# Patient Record
Sex: MALE | Race: ASIAN | Employment: UNEMPLOYED | URBAN - METROPOLITAN AREA
[De-identification: names, ages, dates, MRNs, and addresses within clinical notes are randomized per-mention and may not be internally consistent; named-entity substitution may affect disease eponyms.]

---

## 2024-04-29 ENCOUNTER — HOSPITAL ENCOUNTER (OUTPATIENT)
Facility: HOSPITAL | Age: 74
Setting detail: OBSERVATION
Discharge: HOME | End: 2024-05-02
Attending: EMERGENCY MEDICINE | Admitting: HOSPITALIST
Payer: COMMERCIAL

## 2024-04-29 ENCOUNTER — APPOINTMENT (OUTPATIENT)
Dept: CT IMAGING | Facility: HOSPITAL | Age: 74
End: 2024-04-29
Attending: EMERGENCY MEDICINE
Payer: COMMERCIAL

## 2024-04-29 ENCOUNTER — APPOINTMENT (OUTPATIENT)
Dept: GENERAL RADIOLOGY | Facility: HOSPITAL | Age: 74
End: 2024-04-29
Attending: EMERGENCY MEDICINE
Payer: COMMERCIAL

## 2024-04-29 DIAGNOSIS — S00.03XA CONTUSION OF SCALP, INITIAL ENCOUNTER: ICD-10-CM

## 2024-04-29 DIAGNOSIS — R07.9 CHEST PAIN OF UNCERTAIN ETIOLOGY: ICD-10-CM

## 2024-04-29 DIAGNOSIS — R55 SYNCOPE AND COLLAPSE: Primary | ICD-10-CM

## 2024-04-29 PROBLEM — E87.20 METABOLIC ACIDOSIS: Status: ACTIVE | Noted: 2024-04-29

## 2024-04-29 PROBLEM — R73.9 HYPERGLYCEMIA: Status: ACTIVE | Noted: 2024-04-29

## 2024-04-29 PROBLEM — E87.1 HYPONATREMIA: Status: ACTIVE | Noted: 2024-04-29

## 2024-04-29 LAB
ALBUMIN SERPL-MCNC: 3.8 G/DL (ref 3.4–5)
ALBUMIN/GLOB SERPL: 1 {RATIO} (ref 1–2)
ALP LIVER SERPL-CCNC: 56 U/L
ALT SERPL-CCNC: 22 U/L
ANION GAP SERPL CALC-SCNC: 12 MMOL/L (ref 0–18)
APTT PPP: 32.5 SECONDS (ref 23.3–35.6)
AST SERPL-CCNC: 15 U/L (ref 15–37)
BASOPHILS # BLD AUTO: 0.06 X10(3) UL (ref 0–0.2)
BASOPHILS NFR BLD AUTO: 0.6 %
BILIRUB SERPL-MCNC: 0.5 MG/DL (ref 0.1–2)
BUN BLD-MCNC: 22 MG/DL (ref 9–23)
CALCIUM BLD-MCNC: 9 MG/DL (ref 8.5–10.1)
CHLORIDE SERPL-SCNC: 103 MMOL/L (ref 98–112)
CO2 SERPL-SCNC: 19 MMOL/L (ref 21–32)
CREAT BLD-MCNC: 1.34 MG/DL
D DIMER PPP FEU-MCNC: 0.3 UG/ML FEU (ref ?–0.72)
EGFRCR SERPLBLD CKD-EPI 2021: 56 ML/MIN/1.73M2 (ref 60–?)
EOSINOPHIL # BLD AUTO: 0.29 X10(3) UL (ref 0–0.7)
EOSINOPHIL NFR BLD AUTO: 3 %
ERYTHROCYTE [DISTWIDTH] IN BLOOD BY AUTOMATED COUNT: 13.5 %
GLOBULIN PLAS-MCNC: 3.8 G/DL (ref 2.8–4.4)
GLUCOSE BLD-MCNC: 177 MG/DL (ref 70–99)
HCT VFR BLD AUTO: 39.7 %
HGB BLD-MCNC: 12.7 G/DL
IMM GRANULOCYTES # BLD AUTO: 0.04 X10(3) UL (ref 0–1)
IMM GRANULOCYTES NFR BLD: 0.4 %
INR BLD: 1.36 (ref 0.8–1.2)
LACTATE SERPL-SCNC: 1.4 MMOL/L (ref 0.4–2)
LYMPHOCYTES # BLD AUTO: 3.6 X10(3) UL (ref 1–4)
LYMPHOCYTES NFR BLD AUTO: 37.3 %
MCH RBC QN AUTO: 27.1 PG (ref 26–34)
MCHC RBC AUTO-ENTMCNC: 32 G/DL (ref 31–37)
MCV RBC AUTO: 84.6 FL
MONOCYTES # BLD AUTO: 1.19 X10(3) UL (ref 0.1–1)
MONOCYTES NFR BLD AUTO: 12.3 %
NEUTROPHILS # BLD AUTO: 4.46 X10 (3) UL (ref 1.5–7.7)
NEUTROPHILS # BLD AUTO: 4.46 X10(3) UL (ref 1.5–7.7)
NEUTROPHILS NFR BLD AUTO: 46.4 %
NT-PROBNP SERPL-MCNC: 878 PG/ML (ref ?–125)
OSMOLALITY SERPL CALC.SUM OF ELEC: 286 MOSM/KG (ref 275–295)
PLATELET # BLD AUTO: 164 10(3)UL (ref 150–450)
POTASSIUM SERPL-SCNC: 4 MMOL/L (ref 3.5–5.1)
PROT SERPL-MCNC: 7.6 G/DL (ref 6.4–8.2)
PROTHROMBIN TIME: 16.8 SECONDS (ref 11.6–14.8)
RBC # BLD AUTO: 4.69 X10(6)UL
SODIUM SERPL-SCNC: 134 MMOL/L (ref 136–145)
TROPONIN I SERPL HS-MCNC: 22 NG/L
WBC # BLD AUTO: 9.6 X10(3) UL (ref 4–11)

## 2024-04-29 PROCEDURE — 99223 1ST HOSP IP/OBS HIGH 75: CPT | Performed by: HOSPITALIST

## 2024-04-29 PROCEDURE — 70450 CT HEAD/BRAIN W/O DYE: CPT | Performed by: EMERGENCY MEDICINE

## 2024-04-29 PROCEDURE — 71045 X-RAY EXAM CHEST 1 VIEW: CPT | Performed by: EMERGENCY MEDICINE

## 2024-04-29 PROCEDURE — 72125 CT NECK SPINE W/O DYE: CPT | Performed by: EMERGENCY MEDICINE

## 2024-04-29 RX ORDER — FUROSEMIDE 20 MG/1
10 TABLET ORAL DAILY
COMMUNITY

## 2024-04-29 RX ORDER — PANTOPRAZOLE SODIUM 40 MG/1
40 TABLET, DELAYED RELEASE ORAL
COMMUNITY

## 2024-04-29 RX ORDER — SPIRONOLACTONE 25 MG/1
25 TABLET ORAL DAILY
COMMUNITY

## 2024-04-29 RX ORDER — RANOLAZINE 500 MG/1
500 TABLET, EXTENDED RELEASE ORAL DAILY
COMMUNITY

## 2024-04-29 RX ORDER — ROSUVASTATIN CALCIUM 20 MG/1
20 TABLET, COATED ORAL NIGHTLY
COMMUNITY

## 2024-04-29 RX ORDER — NITROGLYCERIN 6.5 MG/1
6.5 CAPSULE ORAL 2 TIMES DAILY
COMMUNITY

## 2024-04-29 RX ORDER — CLOPIDOGREL BISULFATE 75 MG/1
75 TABLET ORAL DAILY
COMMUNITY

## 2024-04-29 RX ORDER — ASPIRIN 81 MG/1
75 TABLET ORAL DAILY
Status: ON HOLD | COMMUNITY
End: 2024-04-30

## 2024-04-29 RX ORDER — CARVEDILOL 3.12 MG/1
3.12 TABLET ORAL DAILY
COMMUNITY
End: 2024-05-02

## 2024-04-29 RX ORDER — BUDESONIDE AND FORMOTEROL FUMARATE DIHYDRATE 160; 4.5 UG/1; UG/1
AEROSOL RESPIRATORY (INHALATION) 2 TIMES DAILY
COMMUNITY

## 2024-04-29 RX ORDER — ASPIRIN 81 MG/1
324 TABLET, CHEWABLE ORAL ONCE
Status: COMPLETED | OUTPATIENT
Start: 2024-04-29 | End: 2024-04-29

## 2024-04-30 ENCOUNTER — APPOINTMENT (OUTPATIENT)
Dept: CV DIAGNOSTICS | Facility: HOSPITAL | Age: 74
End: 2024-04-30
Attending: HOSPITALIST
Payer: COMMERCIAL

## 2024-04-30 PROBLEM — R07.9 CHEST PAIN OF UNCERTAIN ETIOLOGY: Status: ACTIVE | Noted: 2024-04-30

## 2024-04-30 PROBLEM — I48.0 PAROXYSMAL ATRIAL FIBRILLATION (HCC): Chronic | Status: ACTIVE | Noted: 2024-04-30

## 2024-04-30 PROBLEM — S00.03XA CONTUSION OF SCALP, INITIAL ENCOUNTER: Status: ACTIVE | Noted: 2024-04-30

## 2024-04-30 PROBLEM — E78.00 PURE HYPERCHOLESTEROLEMIA: Chronic | Status: ACTIVE | Noted: 2024-04-30

## 2024-04-30 LAB
ATRIAL RATE: 89 BPM
BILIRUB UR QL STRIP.AUTO: NEGATIVE
C DIFF TOX B STL QL: NEGATIVE
CLARITY UR REFRACT.AUTO: CLEAR
COLOR UR AUTO: YELLOW
EST. AVERAGE GLUCOSE BLD GHB EST-MCNC: 157 MG/DL (ref 68–126)
GLUCOSE UR STRIP.AUTO-MCNC: NORMAL MG/DL
HBA1C MFR BLD: 7.1 % (ref ?–5.7)
HYALINE CASTS #/AREA URNS AUTO: PRESENT /LPF
KETONES UR STRIP.AUTO-MCNC: NEGATIVE MG/DL
LEUKOCYTE ESTERASE UR QL STRIP.AUTO: NEGATIVE
NITRITE UR QL STRIP.AUTO: NEGATIVE
P AXIS: 68 DEGREES
P-R INTERVAL: 200 MS
PH UR STRIP.AUTO: 6.5 [PH] (ref 5–8)
PROT UR STRIP.AUTO-MCNC: 30 MG/DL
Q-T INTERVAL: 438 MS
QRS DURATION: 144 MS
QTC CALCULATION (BEZET): 532 MS
R AXIS: -24 DEGREES
RBC UR QL AUTO: NEGATIVE
SP GR UR STRIP.AUTO: 1.02 (ref 1–1.03)
T AXIS: 90 DEGREES
TROPONIN I SERPL HS-MCNC: 41 NG/L
TROPONIN I SERPL HS-MCNC: 44 NG/L
UROBILINOGEN UR STRIP.AUTO-MCNC: NORMAL MG/DL
VENTRICULAR RATE: 89 BPM

## 2024-04-30 PROCEDURE — 93306 TTE W/DOPPLER COMPLETE: CPT | Performed by: HOSPITALIST

## 2024-04-30 PROCEDURE — 99232 SBSQ HOSP IP/OBS MODERATE 35: CPT | Performed by: INTERNAL MEDICINE

## 2024-04-30 RX ORDER — MELATONIN
3 NIGHTLY PRN
Status: DISCONTINUED | OUTPATIENT
Start: 2024-04-30 | End: 2024-05-02

## 2024-04-30 RX ORDER — ACETAMINOPHEN 500 MG
500 TABLET ORAL EVERY 4 HOURS PRN
Status: DISCONTINUED | OUTPATIENT
Start: 2024-04-30 | End: 2024-05-02

## 2024-04-30 RX ORDER — METOCLOPRAMIDE HYDROCHLORIDE 5 MG/ML
10 INJECTION INTRAMUSCULAR; INTRAVENOUS EVERY 8 HOURS PRN
Status: DISCONTINUED | OUTPATIENT
Start: 2024-04-30 | End: 2024-05-02

## 2024-04-30 RX ORDER — FLUTICASONE FUROATE AND VILANTEROL 200; 25 UG/1; UG/1
1 POWDER RESPIRATORY (INHALATION) DAILY
Status: DISCONTINUED | OUTPATIENT
Start: 2024-04-30 | End: 2024-05-02

## 2024-04-30 RX ORDER — ASPIRIN 81 MG/1
75 TABLET, CHEWABLE ORAL DAILY
Status: DISCONTINUED | OUTPATIENT
Start: 2024-04-30 | End: 2024-04-30

## 2024-04-30 RX ORDER — ONDANSETRON 2 MG/ML
4 INJECTION INTRAMUSCULAR; INTRAVENOUS EVERY 6 HOURS PRN
Status: DISCONTINUED | OUTPATIENT
Start: 2024-04-30 | End: 2024-05-02

## 2024-04-30 RX ORDER — ROSUVASTATIN CALCIUM 20 MG/1
20 TABLET, COATED ORAL NIGHTLY
Status: DISCONTINUED | OUTPATIENT
Start: 2024-04-30 | End: 2024-05-02

## 2024-04-30 RX ORDER — FUROSEMIDE 20 MG/1
10 TABLET ORAL DAILY
Status: DISCONTINUED | OUTPATIENT
Start: 2024-04-30 | End: 2024-05-02

## 2024-04-30 RX ORDER — ASPIRIN 81 MG/1
75 TABLET, CHEWABLE ORAL DAILY
COMMUNITY
End: 2024-05-02

## 2024-04-30 RX ORDER — SPIRONOLACTONE 25 MG/1
25 TABLET ORAL DAILY
Status: DISCONTINUED | OUTPATIENT
Start: 2024-04-30 | End: 2024-05-02

## 2024-04-30 RX ORDER — RANOLAZINE 500 MG/1
500 TABLET, EXTENDED RELEASE ORAL DAILY
Status: DISCONTINUED | OUTPATIENT
Start: 2024-04-30 | End: 2024-05-02

## 2024-04-30 RX ORDER — PANTOPRAZOLE SODIUM 40 MG/1
40 TABLET, DELAYED RELEASE ORAL
Status: DISCONTINUED | OUTPATIENT
Start: 2024-04-30 | End: 2024-05-02

## 2024-04-30 RX ORDER — CARVEDILOL 3.12 MG/1
3.12 TABLET ORAL 2 TIMES DAILY WITH MEALS
Status: DISCONTINUED | OUTPATIENT
Start: 2024-04-30 | End: 2024-05-02

## 2024-04-30 RX ORDER — CLOPIDOGREL BISULFATE 75 MG/1
75 TABLET ORAL DAILY
Status: DISCONTINUED | OUTPATIENT
Start: 2024-04-30 | End: 2024-05-02

## 2024-04-30 RX ORDER — CARVEDILOL 3.12 MG/1
3.12 TABLET ORAL DAILY
Status: DISCONTINUED | OUTPATIENT
Start: 2024-04-30 | End: 2024-04-30

## 2024-04-30 NOTE — H&P
[facility]     Yazan De León Patient Status:  Observation    1950 MRN [id number]   Location [facility] 2NE-A Attending Jeff Padilla DO   Hosp Day # 0 PCP No primary care provider on file.     Chief Complaint: syncope    Subjective:    History of Present Illness:     Yazan De León is a 73 year old male with history of coronary disease, congestive heart failure, COPD, hyper lipidemia [place] after being in [place] was walking cough across the room and passed out it occurred shortly after he stood up.  Son says he was out for about 5 minutes and was complaining of chest pain and some shortness of breath.  No fevers, chills, nausea, vomiting, diarrhea or constipation.  No palpitations.    History/Other:    Past Medical History:  Past Medical History:    Atherosclerosis of coronary artery    Congestive heart disease (HCC)    COPD (chronic obstructive pulmonary disease) (HCC)    Heart attack (HCC)    High cholesterol    History of angioplasty     Past Surgical History:   History reviewed. No pertinent surgical history.   Family History:   No family history on file.  Social History:    reports that he has quit smoking. His smoking use included cigarettes. He has never used smokeless tobacco. He reports current alcohol use of about 1.0 standard drink of alcohol per week. He reports that he does not use drugs.     Allergies: No Known Allergies    Medications:    No current facility-administered medications on file prior to encounter.     Current Outpatient Medications on File Prior to Encounter   Medication Sig Dispense Refill    aspirin 81 MG Oral Chew Tab Chew 75 mg by mouth daily.      NON FORMULARY Take 30 mg by mouth daily. Domperidone 30 mg daily      NON FORMULARY Place 5 mg under the tongue daily as needed (chest pain).      nitroglycerin ER 6.5 MG Oral Cap CR Take 1 capsule (6.5 mg total) by mouth 2 (two) times daily.       clopidogrel 75 MG Oral Tab Take 1 tablet (75 mg total) by mouth daily.      rosuvastatin 20 MG Oral Tab Take 1 tablet (20 mg total) by mouth nightly.      carvedilol 3.125 MG Oral Tab Take 1 tablet (3.125 mg total) by mouth daily.      apixaban 5 MG Oral Tab Take 1 tablet (5 mg total) by mouth daily.      furosemide 20 MG Oral Tab Take 0.5 tablets (10 mg total) by mouth daily.      spironolactone 25 MG Oral Tab Take 1 tablet (25 mg total) by mouth daily.      pantoprazole 40 MG Oral Tab EC Take 1 tablet (40 mg total) by mouth every morning before breakfast.      ranolazine  MG Oral Tablet 12 Hr Take 1 tablet (500 mg total) by mouth daily.      Budesonide-Formoterol Fumarate 160-4.5 MCG/ACT Inhalation Aerosol Inhale into the lungs 2 (two) times daily.         Review of Systems:   A comprehensive review of systems was completed.    Pertinent positives and negatives noted in the HPI.    Objective:   Physical Exam:    /71 (BP Location: Left arm)   Pulse 70   Temp 97.4 °F (36.3 °C) (Oral)   Resp 16   Wt 139 lb 8.8 oz (63.3 kg)   SpO2 98%   General: No acute distress, Alert  Respiratory: No rhonchi, no wheezes  Cardiovascular: S1, S2. Regular rate and rhythm  Abdomen: Soft, Non-tender, non-distended, positive bowel sounds  Neuro: No new focal deficits  Extremities: No edema      Results:    Labs:      Labs Last 24 Hours:    Recent Labs   Lab 04/29/24 2110   RBC 4.69   HGB 12.7*   HCT 39.7   MCV 84.6   MCH 27.1   MCHC 32.0   RDW 13.5   NEPRELIM 4.46   WBC 9.6   .0       Recent Labs   Lab 04/29/24 2110   *   BUN 22   CREATSERUM 1.34*   EGFRCR 56*   CA 9.0   ALB 3.8   *   K 4.0      CO2 19.0*   ALKPHO 56   AST 15   ALT 22   BILT 0.5   TP 7.6       Lab Results   Component Value Date    INR 1.36 (H) 04/29/2024       Recent Labs   Lab 04/29/24 2110   TROPHS 22       Recent Labs   Lab 04/29/24 2110   PBNP 878*       No results for input(s): \"PCT\" in the last 168  hours.    Imaging: Imaging data reviewed in Epic.    Assessment & Plan:      # Syncope  - Will check orthostatic blood pressures  - Echo ordered.  - Admit to CTU for cardiac monitoring.    # CAD  - S/P stents  - Will continue on plavix, b-blocker, statin, ASA  - serial troponins  - cardiology on consult.    # CHF  - Echo  - lasix, spironolactone, b-blocker,     # Atrial fibrillation  - Will continue on b-blocker and eliquis.    # Scalp contusion  -Treated in the ER.        Plan of care discussed with patient at bedside.    Yoav Melendez, DO    Supplementary Documentation:     The 21st Century Cures Act makes medical notes like these available to patients in the interest of transparency. Please be advised this is a medical document. Medical documents are intended to carry relevant information, facts as evident, and the clinical opinion of the practitioner. The medical note is intended as peer to peer communication and may appear blunt or direct. It is written in medical language and may contain abbreviations or verbiage that are unfamiliar.

## 2024-04-30 NOTE — PLAN OF CARE
Patient is aox3 ,vss,ra,lungs clear, diminished at the bases. NSR. Positive bowel sound, bm this morning. No edema to legs. Denies any chest pain. Trop neg. Echo today. Patient came from [place] 1 month ago and has been coughing and lost weight in the past. Son stated TB test in dec was negative and last echo in [facility] was EF 30%.  Echo done this am.. Dr Dailey saw patient and is adjusting cardiac meds.   Quantiferon ordered.   Up ad flora.   Problem: Patient/Family Goals  Goal: Patient/Family Long Term Goal  Description: Patient's Long Term Goal: Stay out of the hospital    Interventions:  - medications  -follow up  - See additional Care Plan goals for specific interventions  4/30/2024 0933 by Ijeoma Ding, RN  Outcome: Progressing  4/30/2024 0932 by Ijeoma Ding, RN  Outcome: Progressing  Goal: Patient/Family Short Term Goal  Description: Patient's Short Term Goal: manage chest pain    Interventions:   - ASA  -cardiology to see   - See additional Care Plan goals for specific interventions  4/30/2024 0933 by Ijeoma Ding, RN  Outcome: Progressing  4/30/2024 0932 by Ijeoma Ding, RN  Outcome: Progressing

## 2024-04-30 NOTE — ED PROVIDER NOTES
Patient Seen in: [facility] Emergency Department      History     Chief Complaint   Patient presents with    Difficulty Breathing    Fall     Stated Complaint: Pt to ED for difficulty breathing and possible chest pain - EMS says previous MI    Subjective:   HPI    Patient is a 73-year-old male who came a month ago from [place].  Patient has a history of 2 prior MIs and stents according to the son.  Patient today stood up and was walking across the room and passed out.  Patient was unresponsive for about 5 minutes according to the son.  Patient reportedly complained of chest pain with some shortness of breath.   EMS was called and patient was brought in for evaluation.  Patient had chest pain earlier but no chest pain currently, no shortness of breath.  Patient reportedly had 25 to 30% ejection fraction according to the son.  Patient denies previous episodes of syncope.  No abdominal pain, remainder review of systems negative.    Objective:   Past Medical History:    Atherosclerosis of coronary artery    Congestive heart disease (HCC)    COPD (chronic obstructive pulmonary disease) (HCC)    Heart attack (HCC)    History of angioplasty              History reviewed. No pertinent surgical history.             Social History     Socioeconomic History    Marital status: Unknown   Tobacco Use    Smoking status: Former     Types: Cigarettes    Smokeless tobacco: Never   Vaping Use    Vaping status: Never Used   Substance and Sexual Activity    Alcohol use: Yes     Alcohol/week: 1.0 standard drink of alcohol     Types: 1 Standard drinks or equivalent per week    Drug use: Never              Review of Systems    Positive for stated complaint: Pt to ED for difficulty breathing and possible chest pain - EMS says previous MI  Other systems are as noted in HPI.  Constitutional and vital signs reviewed.      All other systems reviewed and negative except as noted above.    Physical Exam     ED Triage Vitals [04/29/24 2107]    /70   Pulse 89   Resp 18   Temp    Temp src    SpO2 95 %   O2 Device None (Room air)       Current:/74   Pulse 71   Resp 19   Wt 70.3 kg   SpO2 99%         Physical Exam  GENERAL: Patient resting comfortably on the cart in no acute distress.  HEENT: Extraocular muscles intact, pupils equal round reactive to light and accommodation.  Mouth normal, neck supple, no meningismus.  Mild tenderness mid cervical region, abrasion to the top of his head.  LUNGS: Lungs clear to auscultation bilaterally.  CARDIOVASCULAR: + S1-S2, regular rate and rhythm, no murmurs.  BACK: No CVA tenderness, no midline bony tenderness.  ABDOMEN: + Bowel sounds, soft, nontender, nondistended.  No rebound, no guarding, no hepatosplenomegaly.  EXTREMITIES: Full range of motion, no tenderness, good capillary refill.  No calf tenderness or edema  SKIN: No rash, good turgor.  NEURO: Patient answers questions appropriately.  No focal deficits appreciated.  Conversant with son.  Moves all extremities.  5 out of 5 strength upper lower extremities.  Sensation tact light touch upper lower extremities.           ED Course     Labs Reviewed   COMP METABOLIC PANEL (14) - Abnormal; Notable for the following components:       Result Value    Glucose 177 (*)     Sodium 134 (*)     CO2 19.0 (*)     Creatinine 1.34 (*)     eGFR-Cr 56 (*)     All other components within normal limits   PRO BETA NATRIURETIC PEPTIDE - Abnormal; Notable for the following components:    Pro-Beta Natriuretic Peptide 878 (*)     All other components within normal limits   PROTHROMBIN TIME (PT) - Abnormal; Notable for the following components:    PT 16.8 (*)     INR 1.36 (*)     All other components within normal limits   CBC W/ DIFFERENTIAL - Abnormal; Notable for the following components:    HGB 12.7 (*)     Monocyte Absolute 1.19 (*)     All other components within normal limits   LACTIC ACID, PLASMA - Normal   TROPONIN I HIGH SENSITIVITY - Normal   D-DIMER - Normal    PTT, ACTIVATED - Normal   CBC WITH DIFFERENTIAL WITH PLATELET    Narrative:     The following orders were created for panel order CBC With Differential With Platelet.  Procedure                               Abnormality         Status                     ---------                               -----------         ------                     CBC W/ DIFFERENTIAL[[id number]]          Abnormal            Final result                 Please view results for these tests on the individual orders.   URINALYSIS, ROUTINE   RAINBOW DRAW LAVENDER   RAINBOW DRAW LIGHT GREEN   RAINBOW DRAW GOLD   RAINBOW DRAW BLUE     EKG    Rate, intervals and axes as noted on EKG Report.  Rate: 89  Rhythm: Sinus Rhythm  Reading: Left bundle branch block, nonspecific ST changes                 CT brain Negative exam.         CT C-spine1. No acute injuries to the cervical spine.  No soft tissue swelling.   2. Multilevel degenerative disc changes, most pronounced at C5-6 where there is suggestion of mild central canal stenosis and mild bilateral neural foraminal stenosis.   3. Relative thyroid enlargement with multilobular goiter.          Chest x-ray1. No acute process.   2. Mild cardiomegaly.   Independent reviewed by myself, no pneumothorax         MDM    Patient was stable throughout his stay in the emergency department.  Patient had no further chest pain.  Patient did have negative troponin.  Patient had negative D-dimer.  Patient is on blood thinners did have CT head and neck negative for fracture or intracranial hemorrhage.  Patient with low ejection fraction and syncope will be admitted for further evaluation.  I did consider arrhythmia, syncope, subdural, PE, ACS.  [facility]    Admission disposition: 4/29/2024 11:44 PM                                        Medical Decision Making      Disposition and Plan     Clinical Impression:  1. Syncope and collapse    2. Contusion of scalp, initial encounter    3.  Chest pain of uncertain etiology         Disposition:  Admit  4/29/2024 11:44 pm    Follow-up:  No follow-up provider specified.        Medications Prescribed:  Current Discharge Medication List                            Hospital Problems       Present on Admission             ICD-10-CM Noted POA    * (Principal) Syncope and collapse R55 4/29/2024 Unknown    Hyperglycemia R73.9 4/29/2024 Yes    Hyponatremia E87.1 4/29/2024 Yes    Metabolic acidosis E87.20 4/29/2024 Yes

## 2024-04-30 NOTE — PROGRESS NOTES
[facility]   part of [facility]     Hospitalist Progress Note     Yazan De León Patient Status:  Observation    1950 MRN [id number]   Location [facility] 2NE-A Attending Milagro Perry,    Hosp Day # 0 PCP No primary care provider on file.     Chief Complaint: Syncope    Subjective:     Patient ambulated without any dizziness or lightheadedness. Denies any chest pain or SOB.    Objective:    Review of Systems:   A comprehensive review of systems was completed; pertinent positive and negatives stated in subjective.    Vital signs:  Temp:  [97.7 °F (36.5 °C)-98.1 °F (36.7 °C)] 98.1 °F (36.7 °C)  Pulse:  [74-90] 74  Resp:  [16-18] 18  BP: ()/(52-83) 106/67  SpO2:  [95 %-100 %] 98 %    Physical Exam:    General: No acute distress, awake and alert  Respiratory: No wheezes, no rhonchi  Cardiovascular: S1, S2, regular rate and rhythm  Abdomen: Soft, Non-tender, non-distended, positive bowel sounds  Neuro: RUSS x 4  Extremities: No edema    Diagnostic Data:    Labs:  Recent Labs   Lab 24  0656   WBC 9.6 5.1   HGB 12.7* 12.8*   MCV 84.6 86.7   .0 145.0*   INR 1.36*  --      Recent Labs   Lab 24  0656   * 120*   BUN 22 16   CREATSERUM 1.34* 1.08   CA 9.0 9.5   ALB 3.8  --    * 136   K 4.0 4.3    105   CO2 19.0* 28.0   ALKPHO 56  --    AST 15  --    ALT 22  --    BILT 0.5  --    TP 7.6  --      CrCl cannot be calculated (Unknown ideal weight.).    Recent Labs   Lab 24  1158 24  1850   TROPHS 22 44 41     Recent Labs   Lab 24   PTP 16.8*   INR 1.36*      Microbiology  No results found for this visit on 24.    Imaging: Reviewed in Epic.    Medications:    lisinopril  2.5 mg Oral Daily    fluticasone furoate-vilanterol  1 puff Inhalation Daily    clopidogrel  75 mg Oral Daily    furosemide  10 mg Oral Daily    pantoprazole  40 mg Oral QAM AC    ranolazine ER  500 mg Oral Daily     rosuvastatin  20 mg Oral Nightly    spironolactone  25 mg Oral Daily    apixaban  5 mg Oral BID    carvedilol  3.125 mg Oral BID with meals       Assessment & Plan:      #Syncope, unclear etiology. ?Cardiac  #Combined systolic/diastolic HF; unclear chronicity of current severity/dysfunction  -EKG with LBBB, no prior comparison available  -Serial troponin negative  -Orthostatics negative  -Echo with eF 10-15%, severe diffuse hypokinesis, G1DD  -Telemetry  -PTA spironolactone, carvedilol, lasix. Also started on low dose lisinopril. Plan for SGLT-2 inhibitor on discharge  -Cardiology consulted, patient refusing R/LHC. Also refusing wearable defibrillator. Plan for medical management     #CAD w/ hx of stents  #HLD  -Plavix, statin, carvedilol, Ranexa     #Atrial fibrillation  -Carvedilol, Eliquis    #DM type II, newly diagnosed with HgA1c 7.1%  -Start ISS, metformin on discharge    #Hx of smoking, possible underlying lung disease with chronic cough  -Resume home inhalers  -TB quantiferon ordered    #GERD  -PPI    Discussed with patient, family, RN, Dr. Corby Blanco.    Lane Blanco, DO    Supplementary Documentation:     Quality:  DVT Mechanical Prophylaxis:        DVT Pharmacologic Prophylaxis   Medication    apixaban (Eliquis) tab 5 mg      DVT Pharmacologic prophylaxis: Aspirin 162 mg     Code Status: Full  Ayoub: No urinary catheter in place  SUNNI: 5/1/2024    Discharge is dependent on: Clinical state, cardiology recs  At this point Mr. De León is expected to be discharge to: Home    The 21st Century Cures Act makes medical notes like these available to patients in the interest of transparency. Please be advised this is a medical document. Medical documents are intended to carry relevant information, facts as evident, and the clinical opinion of the practitioner. The medical note is intended as peer to peer communication and may appear blunt or direct. It is written in medical language and may contain abbreviations  or verbiage that are unfamiliar.

## 2024-04-30 NOTE — PLAN OF CARE
Received pt from ER. Aox4, romulo speaking, pt son present to help interpret. Pt denies any current chest pain. SPO2 is 96% RA, breath sounds clear b/l. NSR, orthostatic bp done, no c/o chest pain. Pt continent, last bm 4/29/24. Skin check was done with Oksana BERGERON RN, no wounds or incisions.     Bed is locked and in low position. Call light and personal items within reach.  Pt and family updated with plan of care           Problem: Patient/Family Goals  Goal: Patient/Family Long Term Goal  Description: Patient's Long Term Goal: Stay out of the hospital    Interventions:  - medications  -follow up  - See additional Care Plan goals for specific interventions  Outcome: Progressing  Goal: Patient/Family Short Term Goal  Description: Patient's Short Term Goal: manage chest pain    Interventions:   - ASA  -cardiology to see   - See additional Care Plan goals for specific interventions  Outcome: Progressing

## 2024-04-30 NOTE — CONSULTS
Cardiology Consultation      Yazan De León Patient Status:  Observation    1950 MRN [id number]   [facility] 2NE-A Attending Jeff Padilla DO   Hosp Day # 0 PCP No primary care provider on file.     Reason for Consultation:  Syncope     History of Present Illness:  Yazan De León is a(n) 73 year old male with chronic medical conditions including hx of MI with hx of PCI in [place] , , HFrEF, HTN, HLD, GERD, AFib who presents with episode of passing out yesterday evening. He lost consciousness and does not recall that event as noted by his daughter in law at bedside. Prior to episode, he noted feeling dizzy and acutely short of breath prompting him to use inhaler. After his episode, his family gave him SL nitroglycerin with no change. Denied chest pain, palpitations, nausea, emesis. He notes he has been dealing with a chronic cough for 6 months. Visiting from [place]. Cardiology asked to evaluate.     History:  Past Medical History:    Atherosclerosis of coronary artery    Congestive heart disease (HCC)    COPD (chronic obstructive pulmonary disease) (HCC)    Heart attack (HCC)    High cholesterol    History of angioplasty     History reviewed. No pertinent surgical history.  No family history on file.   reports that he has quit smoking. His smoking use included cigarettes. He started smoking about 2 years ago. He has never used smokeless tobacco. He reports current alcohol use of about 1.0 standard drink of alcohol per week. He reports that he does not use drugs.    Allergies:  No Known Allergies    Medications:    Current Facility-Administered Medications:     apixaban (Eliquis) tab 5 mg, 5 mg, Oral, Daily    aspirin chewable tab 81 mg, 81 mg, Oral, Daily    fluticasone furoate-vilanterol (Breo Ellipta) 200-25 MCG/ACT inhaler 1 puff, 1 puff, Inhalation, Daily    carvedilol (Coreg) tab 3.125 mg, 3.125 mg, Oral, Daily    clopidogrel (Plavix) tab 75 mg, 75 mg, Oral, Daily     furosemide (Lasix) tab 10 mg, 10 mg, Oral, Daily    pantoprazole (Protonix) DR tab 40 mg, 40 mg, Oral, QAM AC    ranolazine ER (Ranexa) 12 hr tab 500 mg, 500 mg, Oral, Daily    rosuvastatin (Crestor) tab 20 mg, 20 mg, Oral, Nightly    spironolactone (Aldactone) tab 25 mg, 25 mg, Oral, Daily    melatonin tab 3 mg, 3 mg, Oral, Nightly PRN    ondansetron (Zofran) 4 MG/2ML injection 4 mg, 4 mg, Intravenous, Q6H PRN    metoclopramide (Reglan) 5 mg/mL injection 10 mg, 10 mg, Intravenous, Q8H PRN    acetaminophen (Tylenol Extra Strength) tab 500 mg, 500 mg, Oral, Q4H PRN    Review of Systems:  A comprehensive review of systems was negative if not otherwise mention in above HPI.    /81 (BP Location: Right arm)   Pulse 88   Temp 98.3 °F (36.8 °C) (Oral)   Resp 18   Wt 139 lb 8.8 oz (63.3 kg)   SpO2 98%   Temp (24hrs), Av.9 °F (36.6 °C), Min:97.4 °F (36.3 °C), Max:98.3 °F (36.8 °C)       Intake/Output Summary (Last 24 hours) at 2024 0828  Last data filed at 2024 0050  Gross per 24 hour   Intake 120 ml   Output 200 ml   Net -80 ml     Wt Readings from Last 3 Encounters:   24 139 lb 8.8 oz (63.3 kg)       Physical Exam:   General: Alert and oriented x 3. No apparent distress. No respiratory or constitutional distress.  HEENT: Normocephalic, anicteric sclera, neck supple.  Neck: No JVD  Cardiac: Regular rate and rhythm. S1, S2 normal. No murmur, pericardial rub, S3.  Lungs: Clear without wheezes, rales, rhonchi or dullness.  Normal excursions and effort.  Abdomen: Soft, non-tender. BS-present.  Extremities: Without clubbing, cyanosis or edema.    Neurologic: Alert and oriented, normal affect.  Skin: Warm and dry.     Laboratory Data:  Lab Results   Component Value Date    WBC 9.6 2024    HGB 12.7 2024    HCT 39.7 2024    .0 2024    CREATSERUM 1.34 2024    BUN 22 2024     2024    K 4.0 2024     2024    CO2 19.0 2024      04/29/2024    CA 9.0 04/29/2024    ALB 3.8 04/29/2024    ALKPHO 56 04/29/2024    BILT 0.5 04/29/2024    TP 7.6 04/29/2024    AST 15 04/29/2024    ALT 22 04/29/2024    PTT 32.5 04/29/2024    INR 1.36 04/29/2024    PTP 16.8 04/29/2024    DDIMER 0.30 04/29/2024       Imaging/results:  High sensitivity troponin - WNL x1  Ddimer - WNL   EKG - NSR. LBBB.   CT brain - no acute findings   CXR - No acute cardiopulm findings       Assessment:  Syncope w/ fall - proceeded by acute shortness of breath episode and dizziness   Chronic shortness of breath and cough   CAD w/ hx of MI's and PCI in [place] 1997, 2021   Hx of HFrEF, 25-30%, per patient   Afib   HTN  HLD  Former tobacco user       Plan:  ECHO   Check orthostats   Tele monitoring   Reviewed home med list - unclear why he is on every day dosing of coreg and eliquis - change to bid dosing   Discontinue triple therapy - cont plavix and eliquis   May have underlying lung disease         Thank you for allowing me to participate in the care of your patient.      Corby Blanco DO  Cardiologist  [facility]  4/30/2024 8:28 AM      Note to the patient: The 21st Century Cures Act makes medical notes like these available to patients in the interest of transparency. However, be advised that this is a medical document. It is intended as peer to peer communication. It is written in medical language and may contain abbreviations or verbiage that are unfamiliar. It may appear blunt or direct. Medical documents are intended to carry relevant information, facts as evident, and clinical opinion of the practitioner.     Disclaimer: Components of this note were documented using voice recognition system and are subject to errors not corrected at proofreading. Contact the author of this note for any clarifications.

## 2024-04-30 NOTE — PROGRESS NOTES
04/30/24 0055 04/30/24 0100 04/30/24 0105   Vital Signs   Pulse 69 72 82   /66 113/72 107/68   MAP (mmHg) 82 84 79   BP Location Right arm Right arm Right arm   BP Method Automatic Automatic Automatic   Patient Position Lying Sitting Standing     Asymptomatic

## 2024-04-30 NOTE — ED INITIAL ASSESSMENT (HPI)
Pt from home with son. Son states pt was having LAMAR and put hands on table then had LOC falling forward and hitting head on hardwood floor. Pt then woke unaware of had happened. Pt c/o brief CP after LOC which has resolved. Upon arrival pt c/o LAMAR.

## 2024-04-30 NOTE — PROGRESS NOTES
Received patient from ER. TB isolation screening questions triggering airborne isolation precautions for cough greater than 4 weeks, recent unexplained weight loss, and history of living outside of the [place]. Airborne isolation precautions placed per protocol. Dr. Bobby made aware via page. Phone call received with orders to DC airborne isolation at this time,  no need for TB workup per Dr. Bobby. Patient and family made aware.

## 2024-04-30 NOTE — PROGRESS NOTES
[facility]   part of [facility]     Hospitalist Progress Note     Yazan De León Patient Status:  Observation    1950 MRN [id number]   Location [facility] 2NE-A Attending Milagro Perry, DO   Hosp Day # 0 PCP No primary care provider on file.     Chief Complaint: syncope    Subjective:     Patient resting comfortably in bed. Son at bedside providing translation. Pt denies any complaints, wants to know when he can go home. Denies any chest pain/pressure, SOB, but son reports he has been having BALDWIN    Objective:    Review of Systems:   A comprehensive review of systems was completed; pertinent positive and negatives stated in subjective.    Vital signs:  Temp:  [97.4 °F (36.3 °C)-98.3 °F (36.8 °C)] 98.1 °F (36.7 °C)  Pulse:  [69-89] 78  Resp:  [13-21] 18  BP: ()/(62-81) 106/64  SpO2:  [95 %-100 %] 98 %    Physical Exam:    General: No acute distress, frail/elderly appearing   Respiratory: No wheezes, no rhonchi  Cardiovascular: S1, S2, regular rate and rhythm  Abdomen: Soft, Non-tender, non-distended, positive bowel sounds  Neuro: No new focal deficits.   Extremities: No edema    Diagnostic Data:    Labs:  Recent Labs   Lab 24   WBC 9.6   HGB 12.7*   MCV 84.6   .0   INR 1.36*       Recent Labs   Lab 24   *   BUN 22   CREATSERUM 1.34*   CA 9.0   ALB 3.8   *   K 4.0      CO2 19.0*   ALKPHO 56   AST 15   ALT 22   BILT 0.5   TP 7.6       CrCl cannot be calculated (Unknown ideal weight.).    Recent Labs   Lab 24  1158   TROPHS 22 44       Recent Labs   Lab 24   PTP 16.8*   INR 1.36*                  Microbiology    No results found for this visit on 24.      Imaging: Reviewed in Epic.    Medications:    fluticasone furoate-vilanterol  1 puff Inhalation Daily    clopidogrel  75 mg Oral Daily    furosemide  10 mg Oral Daily    pantoprazole  40 mg Oral QAM AC    ranolazine ER  500 mg Oral Daily     rosuvastatin  20 mg Oral Nightly    spironolactone  25 mg Oral Daily    apixaban  5 mg Oral BID    carvedilol  3.125 mg Oral BID with meals       Assessment & Plan:      #Syncope, unclear etiology ?cardiac  #Combined systolic/diastolic HF; unclear chronicity of current severity/dysfunction  - EKG with LBBB, no prior comparison available  - serial troponin negative  - echo with eF 10-15%, severe diffuse hypokinesis, G1DD  - telemetry  - PTA spironolactone, carvedilol   - cardiology consulted      #CAD w/ hx of stents  #HLD  - PTA DAPT, statin, carvedilol     #Atrial fibrillation  - carvedilol, Eliquis    #Hyperglycemia  - HgbA1c ordered       Milagro Perry DO    Supplementary Documentation:     Quality:  DVT Mechanical Prophylaxis:        DVT Pharmacologic Prophylaxis   Medication    apixaban (Eliquis) tab 5 mg      DVT Pharmacologic prophylaxis: Aspirin 162 mg         Code Status: Not on file  Ayoub: No urinary catheter in place  Ayoub Duration (in days):   Central line:    SUNNI: 5/1/2024    Discharge is dependent on: clinical state, cardiology recs  At this point Mr. De León is expected to be discharge to: home    The 21st Century Cures Act makes medical notes like these available to patients in the interest of transparency. Please be advised this is a medical document. Medical documents are intended to carry relevant information, facts as evident, and the clinical opinion of the practitioner. The medical note is intended as peer to peer communication and may appear blunt or direct. It is written in medical language and may contain abbreviations or verbiage that are unfamiliar.

## 2024-04-30 NOTE — ED QUICK NOTES
Pt from home with son. Son states pt was having LAMAR and put hands on table then had LOC falling forward and hitting head on hardwood floor. Pt then woke unaware of had happened. Pt c/o brief CP after LOC which has resolved. Upon arrival pt c/o LAMAR. O2 placed for comfort. C-collar in place from EMS

## 2024-04-30 NOTE — PLAN OF CARE
Problem: Patient/Family Goals  Goal: Patient/Family Long Term Goal  Description: Patient's Long Term Goal: Stay out of the hospital    Interventions:  - medications  -follow up  - See additional Care Plan goals for specific interventions  4/30/2024 1553 by Ijeoma Ding, RN  Outcome: Progressing  4/30/2024 0933 by Ijeoma Ding, RN  Outcome: Progressing  4/30/2024 0932 by Ijeoma Ding, RN  Outcome: Progressing  Goal: Patient/Family Short Term Goal  Description: Patient's Short Term Goal: manage chest pain    Interventions:   - ASA  -cardiology to see   - See additional Care Plan goals for specific interventions  4/30/2024 1553 by Ijeoma Ding, RN  Outcome: Progressing  4/30/2024 0933 by Ijeoma Ding, RN  Outcome: Progressing  4/30/2024 0932 by Ijeoma Ding, RN  Outcome: Progressing

## 2024-04-30 NOTE — ED QUICK NOTES
Orders for admission, patient is aware of plan and ready to go upstairs. Any questions, please call ED RN Katt at extension [phone].     Patient Covid vaccination status: Unvaccinated     COVID Test Ordered in ED: None    COVID Suspicion at Admission: N/A    Running Infusions:  None    Mental Status/LOC at time of transport: a/o x4    Other pertinent information:   CIWA score: N/A   NIH score:  N/A

## 2024-05-01 LAB
ANION GAP SERPL CALC-SCNC: 3 MMOL/L (ref 0–18)
BASOPHILS # BLD AUTO: 0.04 X10(3) UL (ref 0–0.2)
BASOPHILS NFR BLD AUTO: 0.8 %
BUN BLD-MCNC: 16 MG/DL (ref 9–23)
CALCIUM BLD-MCNC: 9.5 MG/DL (ref 8.5–10.1)
CHLORIDE SERPL-SCNC: 105 MMOL/L (ref 98–112)
CO2 SERPL-SCNC: 28 MMOL/L (ref 21–32)
CREAT BLD-MCNC: 1.08 MG/DL
EGFRCR SERPLBLD CKD-EPI 2021: 73 ML/MIN/1.73M2 (ref 60–?)
EOSINOPHIL # BLD AUTO: 0.18 X10(3) UL (ref 0–0.7)
EOSINOPHIL NFR BLD AUTO: 3.5 %
ERYTHROCYTE [DISTWIDTH] IN BLOOD BY AUTOMATED COUNT: 13.7 %
GLUCOSE BLD-MCNC: 102 MG/DL (ref 70–99)
GLUCOSE BLD-MCNC: 120 MG/DL (ref 70–99)
GLUCOSE BLD-MCNC: 126 MG/DL (ref 70–99)
GLUCOSE BLD-MCNC: 158 MG/DL (ref 70–99)
HCT VFR BLD AUTO: 40.5 %
HGB BLD-MCNC: 12.8 G/DL
IMM GRANULOCYTES # BLD AUTO: 0.01 X10(3) UL (ref 0–1)
IMM GRANULOCYTES NFR BLD: 0.2 %
LYMPHOCYTES # BLD AUTO: 1.89 X10(3) UL (ref 1–4)
LYMPHOCYTES NFR BLD AUTO: 36.8 %
MCH RBC QN AUTO: 27.4 PG (ref 26–34)
MCHC RBC AUTO-ENTMCNC: 31.6 G/DL (ref 31–37)
MCV RBC AUTO: 86.7 FL
MONOCYTES # BLD AUTO: 0.81 X10(3) UL (ref 0.1–1)
MONOCYTES NFR BLD AUTO: 15.8 %
NEUTROPHILS # BLD AUTO: 2.2 X10 (3) UL (ref 1.5–7.7)
NEUTROPHILS # BLD AUTO: 2.2 X10(3) UL (ref 1.5–7.7)
NEUTROPHILS NFR BLD AUTO: 42.9 %
OSMOLALITY SERPL CALC.SUM OF ELEC: 284 MOSM/KG (ref 275–295)
PLATELET # BLD AUTO: 145 10(3)UL (ref 150–450)
POTASSIUM SERPL-SCNC: 4.3 MMOL/L (ref 3.5–5.1)
RBC # BLD AUTO: 4.67 X10(6)UL
SODIUM SERPL-SCNC: 136 MMOL/L (ref 136–145)
WBC # BLD AUTO: 5.1 X10(3) UL (ref 4–11)

## 2024-05-01 PROCEDURE — 99232 SBSQ HOSP IP/OBS MODERATE 35: CPT | Performed by: INTERNAL MEDICINE

## 2024-05-01 RX ORDER — DOCUSATE SODIUM 100 MG/1
100 CAPSULE, LIQUID FILLED ORAL 2 TIMES DAILY
Status: DISCONTINUED | OUTPATIENT
Start: 2024-05-01 | End: 2024-05-02

## 2024-05-01 RX ORDER — LISINOPRIL 2.5 MG/1
2.5 TABLET ORAL DAILY
Status: DISCONTINUED | OUTPATIENT
Start: 2024-05-01 | End: 2024-05-02

## 2024-05-01 RX ORDER — ENEMA 19; 7 G/133ML; G/133ML
1 ENEMA RECTAL ONCE AS NEEDED
Status: DISCONTINUED | OUTPATIENT
Start: 2024-05-01 | End: 2024-05-02

## 2024-05-01 RX ORDER — NICOTINE POLACRILEX 4 MG
15 LOZENGE BUCCAL
Status: DISCONTINUED | OUTPATIENT
Start: 2024-05-01 | End: 2024-05-02

## 2024-05-01 RX ORDER — DEXTROSE MONOHYDRATE 25 G/50ML
50 INJECTION, SOLUTION INTRAVENOUS
Status: DISCONTINUED | OUTPATIENT
Start: 2024-05-01 | End: 2024-05-02

## 2024-05-01 RX ORDER — BISACODYL 10 MG
10 SUPPOSITORY, RECTAL RECTAL
Status: DISCONTINUED | OUTPATIENT
Start: 2024-05-01 | End: 2024-05-02

## 2024-05-01 RX ORDER — POLYETHYLENE GLYCOL 3350 17 G/17G
17 POWDER, FOR SOLUTION ORAL DAILY PRN
Status: DISCONTINUED | OUTPATIENT
Start: 2024-05-01 | End: 2024-05-02

## 2024-05-01 RX ORDER — NICOTINE POLACRILEX 4 MG
30 LOZENGE BUCCAL
Status: DISCONTINUED | OUTPATIENT
Start: 2024-05-01 | End: 2024-05-02

## 2024-05-01 NOTE — PROGRESS NOTES
Cardiology Progress Note    Yazan De León Patient Status:  Observation    1950 MRN [id number]   [facility] 2NE-A Attending Lane Blanco DO   Hosp Day # 0 PCP No primary care provider on file.       Subjective:     Patient seen and examined this AM. Son and wife at bedside. Denies any complaints. Eager to go home.     Objective:   Temp: 98.1 °F (36.7 °C)  Pulse: 74  Resp: 18  BP: 106/67    Intake/Output:     Intake/Output Summary (Last 24 hours) at 2024 0940  Last data filed at 2024 1930  Gross per 24 hour   Intake 360 ml   Output --   Net 360 ml       Last 3 Weights   24 0050 139 lb 8.8 oz (63.3 kg)   24 155 lb (70.3 kg)       Tele: NSR     Physical Exam:     General: Alert and oriented x 3. No apparent distress. No respiratory or constitutional distress.  HEENT: Normocephalic, anicteric sclera, neck supple.  Neck: No JVD  Cardiac: Regular rate and rhythm. S1, S2 normal. No murmur, pericardial rub, S3.  Lungs: Clear without wheezes, rales, rhonchi or dullness.  Normal excursions and effort.  Abdomen: Soft, non-tender.  Extremities: Without clubbing, cyanosis or edema.  Peripheral pulses are 2+.  Neurologic: Alert and oriented, normal affect.  Skin: Warm and dry.     Laboratory/Data:    Labs:         Recent Labs   Lab 24  0656   WBC 9.6 5.1   HGB 12.7* 12.8*   MCV 84.6 86.7   .0 145.0*   INR 1.36*  --        Recent Labs   Lab 24  0656   * 136   K 4.0 4.3    105   CO2 19.0* 28.0   BUN 22 16   CREATSERUM 1.34* 1.08   CA 9.0 9.5   * 120*       Recent Labs   Lab 24   ALT 22   AST 15   ALB 3.8       No results for input(s): \"TROP\" in the last 168 hours.    Allergies:   No Known Allergies    Medications:  Current Facility-Administered Medications   Medication Dose Route Frequency    fluticasone furoate-vilanterol (Breo Ellipta) 200-25 MCG/ACT inhaler 1 puff  1 puff Inhalation Daily     clopidogrel (Plavix) tab 75 mg  75 mg Oral Daily    furosemide (Lasix) tab 10 mg  10 mg Oral Daily    pantoprazole (Protonix) DR tab 40 mg  40 mg Oral QAM AC    ranolazine ER (Ranexa) 12 hr tab 500 mg  500 mg Oral Daily    rosuvastatin (Crestor) tab 20 mg  20 mg Oral Nightly    spironolactone (Aldactone) tab 25 mg  25 mg Oral Daily    melatonin tab 3 mg  3 mg Oral Nightly PRN    ondansetron (Zofran) 4 MG/2ML injection 4 mg  4 mg Intravenous Q6H PRN    metoclopramide (Reglan) 5 mg/mL injection 10 mg  10 mg Intravenous Q8H PRN    acetaminophen (Tylenol Extra Strength) tab 500 mg  500 mg Oral Q4H PRN    apixaban (Eliquis) tab 5 mg  5 mg Oral BID    carvedilol (Coreg) tab 3.125 mg  3.125 mg Oral BID with meals         Assessment:  Syncope w/ fall - proceeded by acute shortness of breath episode and dizziness (cannot rule out malignant arrhythmia)   Chronic shortness of breath and cough   CAD w/ hx of MI's and PCI in zari 1997, 2021   Hx of HFrEF, 25-30%, per patient  --> now 10-15% with global LV dilation   Afib   HTN  HLD  Former tobacco user         Plan:   Given ECHO findings, recommend Right and left heart catheterization -- however, patient and family refuse at this time given patient wishes for no invasive management. He prefers medical management at this time.   GDMT:  Coreg 3.125 mg bid   Spironolactone 25 mg every day   Start lisinopril 2.5 mg every day    SGLT-2 inhibitor at discharge   Further addition limited by BP  Also recommended wearable defibrillator, which he is also refusing.   Cont plavix and eliquis   Needs PFT's          Corby Blanco DO  Cardiologist  [facility]  5/1/2024 9:40 AM        Note to the patient: The 21st Century Cures Act makes medical notes like these available to patients in the interest of transparency. However, be advised that this is a medical document. It is intended as peer to peer communication. It is written in medical language and may contain  abbreviations or verbiage that are unfamiliar. It may appear blunt or direct. Medical documents are intended to carry relevant information, facts as evident, and clinical opinion of the practitioner.     Disclaimer: Components of this note were documented using voice recognition system and are subject to errors not corrected at proofreading. Contact the author of this note for any clarifications.

## 2024-05-01 NOTE — PLAN OF CARE
Aox4. Pt denies any chest pain at this time. SPO2 97% RA, breath sounds clear/diminished. NSR. Continent, last bm 4/30/24.     Bed is locked and in low position. Call light and personal items within reach.  Pt and family updated with plan of care     Approx 0600 Orthostatic done- positive. Pt asymptomatic.     Problem: Patient/Family Goals  Goal: Patient/Family Long Term Goal  Description: Patient's Long Term Goal: Stay out of the hospital    Interventions:  - medications  -follow up  - See additional Care Plan goals for specific interventions  Outcome: Progressing  Goal: Patient/Family Short Term Goal  Description: Patient's Short Term Goal: manage chest pain    Interventions:   - ASA  -cardiology to see   - See additional Care Plan goals for specific interventions  Outcome: Progressing

## 2024-05-01 NOTE — PLAN OF CARE
Assumed care of pt around 0730  AxO x4, R/A, up SBA  NSR on tele, no cardiac symptoms  Pt denies any pain  Continent of bowel and bladder  Fall precautions in place  Pt updated on plan of care, all needs met at this time            Problem: Patient/Family Goals  Goal: Patient/Family Long Term Goal  Description: Patient's Long Term Goal: Stay out of the hospital    Interventions:  - medications  -follow up  - See additional Care Plan goals for specific interventions  Outcome: Progressing  Goal: Patient/Family Short Term Goal  Description: Patient's Short Term Goal: manage chest pain    Interventions:   - ASA  -cardiology to see   - See additional Care Plan goals for specific interventions  Outcome: Progressing     Problem: CARDIOVASCULAR - ADULT  Goal: Maintains optimal cardiac output and hemodynamic stability  Description: INTERVENTIONS:  - Monitor vital signs, rhythm, and trends  - Monitor for bleeding, hypotension and signs of decreased cardiac output  - Evaluate effectiveness of vasoactive medications to optimize hemodynamic stability  - Monitor arterial and/or venous puncture sites for bleeding and/or hematoma  - Assess quality of pulses, skin color and temperature  - Assess for signs of decreased coronary artery perfusion - ex. Angina  - Evaluate fluid balance, assess for edema, trend weights  Outcome: Progressing  Goal: Absence of cardiac arrhythmias or at baseline  Description: INTERVENTIONS:  - Continuous cardiac monitoring, monitor vital signs, obtain 12 lead EKG if indicated  - Evaluate effectiveness of antiarrhythmic and heart rate control medications as ordered  - Initiate emergency measures for life threatening arrhythmias  - Monitor electrolytes and administer replacement therapy as ordered  Outcome: Progressing     Problem: SAFETY ADULT - FALL  Goal: Free from fall injury  Description: INTERVENTIONS:  - Assess pt frequently for physical needs  - Identify cognitive and physical deficits and behaviors  that affect risk of falls.  - [place] fall precautions as indicated by assessment.  - Educate pt/family on patient safety including physical limitations  - Instruct pt to call for assistance with activity based on assessment  - Modify environment to reduce risk of injury  - Provide assistive devices as appropriate  - Consider OT/PT consult to assist with strengthening/mobility  - Encourage toileting schedule  Outcome: Progressing     Problem: Altered Communication/Language Barrier  Goal: Patient/Family is able to understand and participate in their care  Description: Interventions:  - Assess communication ability and preferred communication style  - Implement communication aides and strategies  - Use visual cues when possible  - Listen attentively, be patient, do not interrupt  - Minimize distractions  - Allow time for understanding and response  - Establish method for patient to ask for assistance (call light)  - Provide an  as needed  - Communicate barriers and strategies to overcome with those who interact with patient  Outcome: Progressing

## 2024-05-01 NOTE — DISCHARGE INSTRUCTIONS
Going Home Instructions    In this section you will find the tools which will guide you through the first few days after you leave the hospital. Continued use of these tools will help you develop the skills necessary to keep your heart failure under control.       Home Care Instructions Following Heart Failure - the most important things to do every day include:     Weigh yourself  Take your medicines as prescribed  Limit your sodium (salt) and fluid intake  Know when to call your cardiologist, primary doctor, or nurse  Know when to seek emergency care    Things for You to Remember:   1. See your doctor or healthcare provider.  It is important that you attend this appointment to make sure your symptoms are under control.     2. Your recommended sodium intake is 0627-0465 mg daily    3. Limit your fluid intake to no more than 2 liters or 64 ounces per day    4. Some exercise and activity is important to help keep your heart functioning and strong. Unless instructed not to exercise, you may walk at a slow to moderate pace for 10-15 minutes 2-3 days per week to start. Pace your activity to prevent shortness of breath or fatigue. Stop exercise if you develop chest pain, lightheadedness, or significant shortness of breath.       Call Your Cardiologist If:   You gain 2 pounds overnight or 3-4 pounds in 3-5 days  You have more difficulty breathing  You are getting more tired with normal activity  You are more short of breath lying down, or awaken at night short of breath  You have swelling of your feet or legs  You urinate less often during the day and more often at night  You have cramps in your legs  You have blurred vision or see yellowish-green halos around objects of lights    Go to the Emergency Room If:   You have pain or tightness in your chest  You are extremely short of breath  You are coughing up pink-frothy mucus  You are traveling and develop symptoms of worsening heart failure

## 2024-05-01 NOTE — PROGRESS NOTES
05/01/24 0554 05/01/24 0559 05/01/24 0601   Vital Signs   Pulse 76 90 88   Heart Rate Source Monitor Monitor Monitor   Cardiac Rhythm NSR NSR NSR   Resp 16 16 16   Respiratory Quality Normal Normal Normal   /65 95/65 (!) 71/55   MAP (mmHg) 80 74 (!) 62   BP Location Right arm Right arm Right arm   BP Method Automatic Automatic Automatic   Patient Position Lying Lying Lying     + Orthostatic BP- Asymptomatic

## 2024-05-02 VITALS
OXYGEN SATURATION: 98 % | TEMPERATURE: 98 F | RESPIRATION RATE: 16 BRPM | HEART RATE: 84 BPM | WEIGHT: 139.56 LBS | DIASTOLIC BLOOD PRESSURE: 71 MMHG | SYSTOLIC BLOOD PRESSURE: 106 MMHG

## 2024-05-02 LAB
ANION GAP SERPL CALC-SCNC: 5 MMOL/L (ref 0–18)
BUN BLD-MCNC: 19 MG/DL (ref 9–23)
CALCIUM BLD-MCNC: 9.3 MG/DL (ref 8.5–10.1)
CHLORIDE SERPL-SCNC: 104 MMOL/L (ref 98–112)
CO2 SERPL-SCNC: 27 MMOL/L (ref 21–32)
CREAT BLD-MCNC: 1.06 MG/DL
EGFRCR SERPLBLD CKD-EPI 2021: 75 ML/MIN/1.73M2 (ref 60–?)
GLUCOSE BLD-MCNC: 104 MG/DL (ref 70–99)
GLUCOSE BLD-MCNC: 107 MG/DL (ref 70–99)
GLUCOSE BLD-MCNC: 176 MG/DL (ref 70–99)
M TB IFN-G CD4+ T-CELLS BLD-ACNC: 0.08 IU/ML
M TB TUBERC IFN-G BLD QL: NEGATIVE
M TB TUBERC IGNF/MITOGEN IGNF CONTROL: >10 IU/ML
OSMOLALITY SERPL CALC.SUM OF ELEC: 285 MOSM/KG (ref 275–295)
POTASSIUM SERPL-SCNC: 4.1 MMOL/L (ref 3.5–5.1)
QFT TB1 AG MINUS NIL: -0.02 IU/ML
QFT TB2 AG MINUS NIL: -0.01 IU/ML
SODIUM SERPL-SCNC: 136 MMOL/L (ref 136–145)

## 2024-05-02 PROCEDURE — 99239 HOSP IP/OBS DSCHRG MGMT >30: CPT | Performed by: INTERNAL MEDICINE

## 2024-05-02 RX ORDER — LISINOPRIL 2.5 MG/1
2.5 TABLET ORAL DAILY
Qty: 30 TABLET | Refills: 1 | Status: SHIPPED | OUTPATIENT
Start: 2024-05-03

## 2024-05-02 RX ORDER — CARVEDILOL 3.12 MG/1
3.12 TABLET ORAL 2 TIMES DAILY WITH MEALS
Qty: 60 TABLET | Refills: 3 | Status: SHIPPED | OUTPATIENT
Start: 2024-05-02

## 2024-05-02 RX ORDER — SENNA AND DOCUSATE SODIUM 50; 8.6 MG/1; MG/1
2 TABLET, FILM COATED ORAL DAILY
Qty: 30 TABLET | Refills: 0 | Status: SHIPPED | OUTPATIENT
Start: 2024-05-02

## 2024-05-02 RX ORDER — SENNA AND DOCUSATE SODIUM 50; 8.6 MG/1; MG/1
2 TABLET, FILM COATED ORAL DAILY
Qty: 30 TABLET | Refills: 0 | Status: SHIPPED | OUTPATIENT
Start: 2024-05-02 | End: 2024-05-02

## 2024-05-02 RX ORDER — LISINOPRIL 2.5 MG/1
2.5 TABLET ORAL DAILY
Qty: 30 TABLET | Refills: 1 | Status: SHIPPED | OUTPATIENT
Start: 2024-05-03 | End: 2024-05-02

## 2024-05-02 NOTE — PLAN OF CARE
Discharged  home via wheelchair  Accompanied by Support staff   Belongings taken by patient/family  PIV removed  Tele box removed & placed in the drawer  Discharge Navigator completed  Discharge instructions reviewed with patient a bedside  All questions & concerns addressed at his time.           Problem: Patient/Family Goals  Goal: Patient/Family Long Term Goal  Description: Patient's Long Term Goal: Stay out of the hospital    Interventions:  - medications  -follow up  - See additional Care Plan goals for specific interventions  Outcome: Progressing  Goal: Patient/Family Short Term Goal  Description: Patient's Short Term Goal: manage chest pain  5/2: go home     Interventions:   - ASA  -cardiology to see   - See additional Care Plan goals for specific interventions  Outcome: Progressing     Problem: CARDIOVASCULAR - ADULT  Goal: Maintains optimal cardiac output and hemodynamic stability  Description: INTERVENTIONS:  - Monitor vital signs, rhythm, and trends  - Monitor for bleeding, hypotension and signs of decreased cardiac output  - Evaluate effectiveness of vasoactive medications to optimize hemodynamic stability  - Monitor arterial and/or venous puncture sites for bleeding and/or hematoma  - Assess quality of pulses, skin color and temperature  - Assess for signs of decreased coronary artery perfusion - ex. Angina  - Evaluate fluid balance, assess for edema, trend weights  Outcome: Progressing  Goal: Absence of cardiac arrhythmias or at baseline  Description: INTERVENTIONS:  - Continuous cardiac monitoring, monitor vital signs, obtain 12 lead EKG if indicated  - Evaluate effectiveness of antiarrhythmic and heart rate control medications as ordered  - Initiate emergency measures for life threatening arrhythmias  - Monitor electrolytes and administer replacement therapy as ordered  Outcome: Progressing     Problem: SAFETY ADULT - FALL  Goal: Free from fall injury  Description: INTERVENTIONS:  - Assess pt  frequently for physical needs  - Identify cognitive and physical deficits and behaviors that affect risk of falls.  - [place] fall precautions as indicated by assessment.  - Educate pt/family on patient safety including physical limitations  - Instruct pt to call for assistance with activity based on assessment  - Modify environment to reduce risk of injury  - Provide assistive devices as appropriate  - Consider OT/PT consult to assist with strengthening/mobility  - Encourage toileting schedule  Outcome: Progressing     Problem: Altered Communication/Language Barrier  Goal: Patient/Family is able to understand and participate in their care  Description: Interventions:  - Assess communication ability and preferred communication style  - Implement communication aides and strategies  - Use visual cues when possible  - Listen attentively, be patient, do not interrupt  - Minimize distractions  - Allow time for understanding and response  - Establish method for patient to ask for assistance (call light)  - Provide an  as needed  - Communicate barriers and strategies to overcome with those who interact with patient  Outcome: Progressing

## 2024-05-02 NOTE — PROGRESS NOTES
Cardiology Progress Note    Yazan De León Patient Status:  Observation    1950 MRN [id number]   Formerly [facility] 2NE-A Attending aLne Blanco DO   Hosp Day # 0 PCP No primary care provider on file.       Subjective:   Resting comfortably. No complaints. Wants to go home.     Objective:   Temp: 98 °F (36.7 °C)  Pulse: 72  Resp: 16  BP: 109/74    Intake/Output:     Intake/Output Summary (Last 24 hours) at 2024 0931  Last data filed at 2024 0850  Gross per 24 hour   Intake 240 ml   Output --   Net 240 ml       Last 3 Weights   24 0050 139 lb 8.8 oz (63.3 kg)   24 155 lb (70.3 kg)       Tele: NSR     Physical Exam:     General: Alert and oriented x 3. No apparent distress. No respiratory or constitutional distress. Stable.   HEENT: Normocephalic, anicteric sclera, neck supple.  Neck: No JVD  Cardiac: Regular rate and rhythm. S1, S2 normal. No murmur, pericardial rub, S3.  Lungs: Clear without wheezes, rales, rhonchi or dullness.  Normal excursions and effort.  Abdomen: Soft, non-tender.  Extremities: Without clubbing, cyanosis or edema.  Peripheral pulses are 2+.  Neurologic: Alert and oriented, normal affect.  Skin: Warm and dry.     Laboratory/Data:    Labs:         Recent Labs   Lab 24  0656   WBC 9.6 5.1   HGB 12.7* 12.8*   MCV 84.6 86.7   .0 145.0*   INR 1.36*  --        Recent Labs   Lab 24  0656 24  0536   * 136 136   K 4.0 4.3 4.1    105 104   CO2 19.0* 28.0 27.0   BUN 22 16 19   CREATSERUM 1.34* 1.08 1.06   CA 9.0 9.5 9.3   * 120* 107*       Recent Labs   Lab 24   ALT 22   AST 15   ALB 3.8       No results for input(s): \"TROP\" in the last 168 hours.    Allergies:   No Known Allergies    Medications:  Current Facility-Administered Medications   Medication Dose Route Frequency    lisinopril (Prinivil; Zestril) tab 2.5 mg  2.5 mg Oral Daily    glucose (Dex4) 15 GM/59ML oral  liquid 15 g  15 g Oral Q15 Min PRN    Or    glucose (Glutose) 40% oral gel 15 g  15 g Oral Q15 Min PRN    Or    glucose-vitamin C (Dex-4) chewable tab 4 tablet  4 tablet Oral Q15 Min PRN    Or    dextrose 50% injection 50 mL  50 mL Intravenous Q15 Min PRN    Or    glucose (Dex4) 15 GM/59ML oral liquid 30 g  30 g Oral Q15 Min PRN    Or    glucose (Glutose) 40% oral gel 30 g  30 g Oral Q15 Min PRN    Or    glucose-vitamin C (Dex-4) chewable tab 8 tablet  8 tablet Oral Q15 Min PRN    insulin aspart (NovoLOG) 100 Units/mL FlexPen 1-10 Units  1-10 Units Subcutaneous TID AC and HS    docusate sodium (Colace) cap 100 mg  100 mg Oral BID    polyethylene glycol (PEG 3350) (Miralax) 17 g oral packet 17 g  17 g Oral Daily PRN    magnesium hydroxide (Milk of Magnesia) 400 MG/5ML oral suspension 30 mL  30 mL Oral Daily PRN    bisacodyl (Dulcolax) 10 MG rectal suppository 10 mg  10 mg Rectal Daily PRN    fleet enema (Fleet) 7-19 GM/118ML rectal enema 133 mL  1 enema Rectal Once PRN    fluticasone furoate-vilanterol (Breo Ellipta) 200-25 MCG/ACT inhaler 1 puff  1 puff Inhalation Daily    clopidogrel (Plavix) tab 75 mg  75 mg Oral Daily    furosemide (Lasix) tab 10 mg  10 mg Oral Daily    pantoprazole (Protonix) DR tab 40 mg  40 mg Oral QAM AC    ranolazine ER (Ranexa) 12 hr tab 500 mg  500 mg Oral Daily    rosuvastatin (Crestor) tab 20 mg  20 mg Oral Nightly    spironolactone (Aldactone) tab 25 mg  25 mg Oral Daily    melatonin tab 3 mg  3 mg Oral Nightly PRN    ondansetron (Zofran) 4 MG/2ML injection 4 mg  4 mg Intravenous Q6H PRN    metoclopramide (Reglan) 5 mg/mL injection 10 mg  10 mg Intravenous Q8H PRN    acetaminophen (Tylenol Extra Strength) tab 500 mg  500 mg Oral Q4H PRN    apixaban (Eliquis) tab 5 mg  5 mg Oral BID    carvedilol (Coreg) tab 3.125 mg  3.125 mg Oral BID with meals         Assessment:  Syncope w/ fall - proceeded by acute shortness of breath episode and dizziness (cannot rule out malignant arrhythmia)    Chronic shortness of breath and cough   CAD w/ hx of MI's and PCI in z[place] 1997, 2021   Hx of HFrEF, 25-30%, per patient  --> now 10-15% with severe global LV dilation   Afib   HTN  HLD  Former tobacco user         Plan:   Given ECHO findings, recommend Right and left heart catheterization -- however, patient and family refuse at this time given patient wishes for no invasive management. He prefers medical management at this time. Also recommended wearable defibrillator, which he is also refusing. Discussed elevated risk of sudden cardiac death and they are aware and understanding.  GDMT:  Coreg 3.125 mg bid   Spironolactone 25 mg every day   Lisinopril 2.5 mg every day    SGLT-2 inhibitor at discharge   Further addition limited by BP  Cont plavix and eliquis   Needs PFT's   Ok to discharge from cardiology perspective with outpatient follow up.      Corby Blanco DO  Cardiologist  [facility]  5/2/2024 9:32 AM      Note to the patient: The 21st Century Cures Act makes medical notes like these available to patients in the interest of transparency. However, be advised that this is a medical document. It is intended as peer to peer communication. It is written in medical language and may contain abbreviations or verbiage that are unfamiliar. It may appear blunt or direct. Medical documents are intended to carry relevant information, facts as evident, and clinical opinion of the practitioner.     Disclaimer: Components of this note were documented using voice recognition system and are subject to errors not corrected at proofreading. Contact the author of this note for any clarifications.

## 2024-05-02 NOTE — PLAN OF CARE
Received patient at 1930.  Alert and oriented x 4. Tele Rhythm NSR, oxygen maintained on room air. Breath sounds are clear, diminished. Skin is intact. Last bowel movement 04/30. Patient voiding with no issues. Patient reports no cardiac symptoms, No chest pain or shortness of breath. Bed is locked and in low position. Call light and personal items within reach. Pt up stand by, relative in the room all night.           Problem: Patient/Family Goals  Goal: Patient/Family Long Term Goal  Description: Patient's Long Term Goal: Stay out of the hospital    Interventions:  - medications  -follow up  - See additional Care Plan goals for specific interventions  Outcome: Progressing  Goal: Patient/Family Short Term Goal  Description: Patient's Short Term Goal: manage chest pain    Interventions:   - ASA  -cardiology to see   - See additional Care Plan goals for specific interventions  Outcome: Progressing     Problem: CARDIOVASCULAR - ADULT  Goal: Maintains optimal cardiac output and hemodynamic stability  Description: INTERVENTIONS:  - Monitor vital signs, rhythm, and trends  - Monitor for bleeding, hypotension and signs of decreased cardiac output  - Evaluate effectiveness of vasoactive medications to optimize hemodynamic stability  - Monitor arterial and/or venous puncture sites for bleeding and/or hematoma  - Assess quality of pulses, skin color and temperature  - Assess for signs of decreased coronary artery perfusion - ex. Angina  - Evaluate fluid balance, assess for edema, trend weights  Outcome: Progressing  Goal: Absence of cardiac arrhythmias or at baseline  Description: INTERVENTIONS:  - Continuous cardiac monitoring, monitor vital signs, obtain 12 lead EKG if indicated  - Evaluate effectiveness of antiarrhythmic and heart rate control medications as ordered  - Initiate emergency measures for life threatening arrhythmias  - Monitor electrolytes and administer replacement therapy as ordered  Outcome: Progressing      Problem: SAFETY ADULT - FALL  Goal: Free from fall injury  Description: INTERVENTIONS:  - Assess pt frequently for physical needs  - Identify cognitive and physical deficits and behaviors that affect risk of falls.  - [place] fall precautions as indicated by assessment.  - Educate pt/family on patient safety including physical limitations  - Instruct pt to call for assistance with activity based on assessment  - Modify environment to reduce risk of injury  - Provide assistive devices as appropriate  - Consider OT/PT consult to assist with strengthening/mobility  - Encourage toileting schedule  Outcome: Progressing     Problem: Altered Communication/Language Barrier  Goal: Patient/Family is able to understand and participate in their care  Description: Interventions:  - Assess communication ability and preferred communication style  - Implement communication aides and strategies  - Use visual cues when possible  - Listen attentively, be patient, do not interrupt  - Minimize distractions  - Allow time for understanding and response  - Establish method for patient to ask for assistance (call light)  - Provide an  as needed  - Communicate barriers and strategies to overcome with those who interact with patient  Outcome: Progressing

## 2024-05-02 NOTE — PLAN OF CARE
Pt is admitted for . Pt is AOX4.  VSS, afebrile and denies any pain. SpO2 maintained on RA. . Denies any SOB, cough. Tele-NSR/BBB. PO Eliquis. Cardiac control  diet. Denies any n/v/d. Voids. Pt said he feels constipated. Gave Milk of magnesium and Colace. Up with SBA.  Pt is updated with plan of care.    Prescription and coupon sent to [facility] for pricing.         Problem: Patient/Family Goals  Goal: Patient/Family Long Term Goal  Description: Patient's Long Term Goal: Stay out of the hospital    Interventions:  - medications  -follow up  - See additional Care Plan goals for specific interventions  Outcome: Progressing  Goal: Patient/Family Short Term Goal  Description: Patient's Short Term Goal: manage chest pain  : go home     Interventions:   - ASA  -cardiology to see   - See additional Care Plan goals for specific interventions  Outcome: Progressing     Problem: CARDIOVASCULAR - ADULT  Goal: Maintains optimal cardiac output and hemodynamic stability  Description: INTERVENTIONS:  - Monitor vital signs, rhythm, and trends  - Monitor for bleeding, hypotension and signs of decreased cardiac output  - Evaluate effectiveness of vasoactive medications to optimize hemodynamic stability  - Monitor arterial and/or venous puncture sites for bleeding and/or hematoma  - Assess quality of pulses, skin color and temperature  - Assess for signs of decreased coronary artery perfusion - ex. Angina  - Evaluate fluid balance, assess for edema, trend weights  Outcome: Progressing  Goal: Absence of cardiac arrhythmias or at baseline  Description: INTERVENTIONS:  - Continuous cardiac monitoring, monitor vital signs, obtain 12 lead EKG if indicated  - Evaluate effectiveness of antiarrhythmic and heart rate control medications as ordered  - Initiate emergency measures for life threatening arrhythmias  - Monitor electrolytes and administer replacement therapy as ordered  Outcome: Progressing     Problem: SAFETY ADULT  - FALL  Goal: Free from fall injury  Description: INTERVENTIONS:  - Assess pt frequently for physical needs  - Identify cognitive and physical deficits and behaviors that affect risk of falls.  - [place] fall precautions as indicated by assessment.  - Educate pt/family on patient safety including physical limitations  - Instruct pt to call for assistance with activity based on assessment  - Modify environment to reduce risk of injury  - Provide assistive devices as appropriate  - Consider OT/PT consult to assist with strengthening/mobility  - Encourage toileting schedule  Outcome: Progressing     Problem: Altered Communication/Language Barrier  Goal: Patient/Family is able to understand and participate in their care  Description: Interventions:  - Assess communication ability and preferred communication style  - Implement communication aides and strategies  - Use visual cues when possible  - Listen attentively, be patient, do not interrupt  - Minimize distractions  - Allow time for understanding and response  - Establish method for patient to ask for assistance (call light)  - Provide an  as needed  - Communicate barriers and strategies to overcome with those who interact with patient  Outcome: Progressing

## 2024-05-02 NOTE — CM/SW NOTE
Scripts sent to  [facility]--informed no rx coverage--family too use good rx to fill--no free/discounted cad available for jardiance--neli yeh

## 2024-05-02 NOTE — DISCHARGE SUMMARY
[place] HOSPITALIST  DISCHARGE SUMMARY     Yazan De León Patient Status:  Observation    1950 MRN [id number]   Location [facility] 2NE-A Attending No att. providers found   Hosp Day # 0 PCP No primary care provider on file.     Date of Admission: 2024  Date of Discharge: 2024  Discharge Disposition: Home or Self Care    Discharge Diagnosis:   Syncope, unclear etiology  Combined chronic systolic and diastolic CHF  CAD s/p prior stents  Hyperlipidemia  Atrial fibrillation  DM type II  Hx of smoking  GERD    History of Present Illness: Yazan De León is a 73 year old male with history of coronary disease, congestive heart failure, COPD, hyper lipidemia [place] being in [facility] was walking cough across the room and passed out it occurred shortly after he stood up.  Son says he was out for about 5 minutes and was complaining of chest pain and some shortness of breath.  No fevers, chills, nausea, vomiting, diarrhea or constipation.  No palpitations.     Brief Synopsis: Orthostatics were negative.  Echo showed EF 10 to 15% with severe diffuse hypokinesis and grade 1 diastolic dysfunction.  Serial troponin was negative.  Cardiology consulted but patient did not want right or left heart cath.  He also refused wearable defibrillator.  Plan is for medical management and so lisinopril was added to his prior to arrival medications of spironolactone, Coreg and Lasix.  He was found to have A1c of 7.1 and so will be on metformin on discharge.  Patient was discharged with outpatient follow-up.    Lace+ Score: 62  59-90 High Risk  29-58 Medium Risk  0-28   Low Risk  Patient was referred to the [facility].    TCM Follow-Up Recommendation:  LACE > 58: High Risk of readmission after discharge from the hospital.    Procedures during hospitalization:   None    Incidental or significant findings and recommendations (brief descriptions):  Please  see brief synopsis above    Lab/Test results pending at Discharge:   None    Consultants:  Cardiology    Discharge Medication List:     Discharge Medications        START taking these medications        Instructions Prescription details   empagliflozin 10 MG Tabs  Commonly known as: Jardiance      Take 1 tablet (10 mg total) by mouth daily.   Quantity: 30 tablet  Refills: 1     lisinopril 2.5 MG Tabs  Commonly known as: Prinivil; Zestril  Start taking on: May 3, 2024      Take 1 tablet (2.5 mg total) by mouth daily.   Quantity: 30 tablet  Refills: 1     metFORMIN 500 MG Tabs  Commonly known as: Glucophage      Take 1 tablet (500 mg total) by mouth daily with breakfast.   Quantity: 30 tablet  Refills: 1     senna-docusate 8.6-50 MG Tabs  Commonly known as: Senokot-S      Take 2 tablets by mouth daily.   Quantity: 30 tablet  Refills: 0            CHANGE how you take these medications        Instructions Prescription details   apixaban 5 MG Tabs  Commonly known as: Eliquis  What changed: when to take this      Take 1 tablet (5 mg total) by mouth 2 (two) times daily.   Refills: 0     carvedilol 3.125 MG Tabs  Commonly known as: Coreg  What changed: when to take this      Take 1 tablet (3.125 mg total) by mouth 2 (two) times daily with meals.   Quantity: 60 tablet  Refills: 3            CONTINUE taking these medications        Instructions Prescription details   Budesonide-Formoterol Fumarate 160-4.5 MCG/ACT Aero  Commonly known as: SYMBICORT      Inhale into the lungs 2 (two) times daily.   Refills: 0     clopidogrel 75 MG Tabs  Commonly known as: Plavix      Take 1 tablet (75 mg total) by mouth daily.   Refills: 0     furosemide 20 MG Tabs  Commonly known as: Lasix      Take 0.5 tablets (10 mg total) by mouth daily.   Refills: 0     nitroglycerin ER 6.5 MG Cpcr  Commonly known as: Nitrobid ER      Take 1 capsule (6.5 mg total) by mouth 2 (two) times daily.   Refills: 0     NON FORMULARY      Take 30 mg by mouth daily.  Domperidone 30 mg daily   Refills: 0     NON FORMULARY      Place 5 mg under the tongue daily as needed (chest pain).   Refills: 0     pantoprazole 40 MG Tbec  Commonly known as: Protonix      Take 1 tablet (40 mg total) by mouth every morning before breakfast.   Refills: 0     ranolazine  MG Tb12  Commonly known as: Ranexa      Take 1 tablet (500 mg total) by mouth daily.   Refills: 0     rosuvastatin 20 MG Tabs  Commonly known as: Crestor      Take 1 tablet (20 mg total) by mouth nightly.   Refills: 0     spironolactone 25 MG Tabs  Commonly known as: Aldactone      Take 1 tablet (25 mg total) by mouth daily.   Refills: 0            STOP taking these medications      aspirin 81 MG Chew                  Where to Get Your Medications        Please  your prescriptions at the location directed by your doctor or nurse    Bring a paper prescription for each of these medications  carvedilol 3.125 MG Tabs  empagliflozin 10 MG Tabs  lisinopril 2.5 MG Tabs  metFORMIN 500 MG Tabs  senna-docusate 8.6-50 MG Tabs         [place] No [place] substances prescribed at discharge.    Follow-up appointment:   Primary care physician    Schedule an appointment as soon as possible for a visit in 1 week(s)  To establish with a physician with [facility], call our Physician Referral line at [phone], Monday through Friday from 7 a.m. to 6 p.m. and Saturdays from 7 a.m. to 1 p.m    [place] [address] [address] [address] [phone]  Follow up in 4 day(s)      Pulmonologist    Schedule an appointment as soon as possible for a visit in 3 week(s)  To establish with a physician with [facility], call our Physician Referral line at [phone], Monday through Friday from 7 a.m. to 6 p.m. and Saturdays from 7 a.m. to 1 p.m    [address] [address] [phone]  Follow up in 2 week(s)  Office will call you to schedule  follow up    Appointments for Next 30 Days 5/2/2024 - 6/1/2024      None            Vital signs:  Temp:  [97.6 °F (36.4 °C)-98 °F (36.7 °C)] 97.6 °F (36.4 °C)  Pulse:  [65-84] 84  Resp:  [16-18] 16  BP: ()/(55-81) 106/71  SpO2:  [96 %-98 %] 98 %    Physical Exam:    General: No acute distress, awake and alert  Respiratory: No wheezes, no rhonchi  Cardiovascular: S1, S2, regular rate and rhythm  Abdomen: Soft, Non-tender, non-distended, positive bowel sounds  Neuro: RUSS x 4  Extremities: No edema  -----------------------------------------------------------------------------------------------  PATIENT DISCHARGE INSTRUCTIONS: See electronic chart    Lane Blanco DO    Time spent:  31 minutes

## 2024-05-02 NOTE — PROGRESS NOTES
Patient seen and examined. Feels well. No chest pain or SOB. Plan for discharge today.    Lane Blanco, DO

## 2024-05-03 ENCOUNTER — PATIENT OUTREACH (OUTPATIENT)
Dept: CASE MANAGEMENT | Age: 74
End: 2024-05-03

## 2024-05-03 NOTE — PROGRESS NOTES
NCM was going to contact patient for TCM however, no contact information available in Epic and [facility] is not active.  Encounter closing.